# Patient Record
Sex: FEMALE | Race: WHITE | URBAN - METROPOLITAN AREA
[De-identification: names, ages, dates, MRNs, and addresses within clinical notes are randomized per-mention and may not be internally consistent; named-entity substitution may affect disease eponyms.]

---

## 2017-09-21 ENCOUNTER — APPOINTMENT (RX ONLY)
Dept: URBAN - METROPOLITAN AREA CLINIC 23 | Facility: CLINIC | Age: 52
Setting detail: DERMATOLOGY
End: 2017-09-21

## 2017-09-21 DIAGNOSIS — L70.0 ACNE VULGARIS: ICD-10-CM

## 2017-09-21 DIAGNOSIS — L65.9 NONSCARRING HAIR LOSS, UNSPECIFIED: ICD-10-CM

## 2017-09-21 PROCEDURE — ? ORDER TESTS

## 2017-09-21 PROCEDURE — ? COUNSELING

## 2017-09-21 PROCEDURE — ? TREATMENT REGIMEN

## 2017-09-21 PROCEDURE — ? PRESCRIPTION

## 2017-09-21 PROCEDURE — ? OTHER

## 2017-09-21 PROCEDURE — 99213 OFFICE O/P EST LOW 20 MIN: CPT

## 2017-09-21 RX ORDER — DAPSONE 50 MG/G
GEL TOPICAL
Qty: 1 | Refills: 2 | COMMUNITY
Start: 2017-09-21

## 2017-09-21 RX ADMIN — DAPSONE: 50 GEL TOPICAL at 14:13

## 2017-09-21 ASSESSMENT — LOCATION SIMPLE DESCRIPTION DERM
LOCATION SIMPLE: RIGHT CHEEK
LOCATION SIMPLE: LEFT CHEEK
LOCATION SIMPLE: SCALP

## 2017-09-21 ASSESSMENT — LOCATION ZONE DERM
LOCATION ZONE: FACE
LOCATION ZONE: SCALP

## 2017-09-21 ASSESSMENT — LOCATION DETAILED DESCRIPTION DERM
LOCATION DETAILED: LEFT LATERAL BUCCAL CHEEK
LOCATION DETAILED: RIGHT CENTRAL BUCCAL CHEEK
LOCATION DETAILED: LEFT SUPERIOR PARIETAL SCALP

## 2017-09-21 NOTE — PROCEDURE: TREATMENT REGIMEN
Detail Level: Detailed
Notification: Please note that there are new Treatment Regimen plans which have an enhanced patient education handout experience.  The plans are called Treatment Regimen (Acne), Treatment Regimen (Atopic Dermatitis), Treatment Regimen (Rosacea), Treatment Regimen (Sun Protection), Treatment Regimen (Cosmetic Products), and Treatment Regimen (Xerosis).
Plan: 5mg of biotin daily

## 2017-09-21 NOTE — PROCEDURE: OTHER
Detail Level: Simple
Note Text (......Xxx Chief Complaint.): This diagnosis correlates with the
Other (Free Text): Plan : Order Tests. \\n	Test: 93068-3 - Ferritin Bld-Imp\\n	Test: 05520-7 - Iron SerPl Ql\\n	Test: 72874-6 - CBC W Auto Diff Bld\\n	Test: 31592-1 - T4 Free+ TSH Pnl SerPl\\n	Test: 43308-7 - DOMENICA Titr Ser

## 2017-10-11 ENCOUNTER — HOSPITAL ENCOUNTER (OUTPATIENT)
Dept: ULTRASOUND IMAGING | Age: 52
Discharge: HOME | End: 2017-10-11
Payer: COMMERCIAL

## 2017-10-11 DIAGNOSIS — R10.13 EPIGASTRIC PAIN: ICD-10-CM

## 2017-10-11 PROCEDURE — 76700 US EXAM ABDOM COMPLETE: CPT

## 2017-10-12 NOTE — PROGRESS NOTES
I called pt back with US results. I advised her that [address] revealed only a hemangioma, which I advised is a collection of blood vessels, and is a benign finding. I advised pt that Jaime Alcala recommends a hida scan if symptoms persist, and possibly a GI referral.  Pt voiced understanding, and states that she would like to give it a day or two, and will call us if she wants us to order a Hida scan. I also advised her that her labs were normal and a lab letter had been mailed to her. Advised pt to call with any questions or problems.   BRITTANY/cortney

## 2017-10-12 NOTE — PROGRESS NOTES
ABD US with hepatic lesion, looks like hemangioma.  If symptoms persist, recommend HIDA scan and possibly GI referral.